# Patient Record
Sex: MALE | Race: WHITE | NOT HISPANIC OR LATINO | Employment: OTHER | URBAN - METROPOLITAN AREA
[De-identification: names, ages, dates, MRNs, and addresses within clinical notes are randomized per-mention and may not be internally consistent; named-entity substitution may affect disease eponyms.]

---

## 2023-02-02 PROBLEM — J18.9 PNEUMONIA OF BOTH LUNGS: Status: ACTIVE | Noted: 2023-02-02

## 2023-02-02 PROBLEM — R05.3 COUGH, PERSISTENT: Status: ACTIVE | Noted: 2023-02-02

## 2023-02-02 PROBLEM — R53.83 FATIGUE: Status: ACTIVE | Noted: 2023-02-02

## 2023-02-02 PROBLEM — H52.00 HYPEROPIA: Status: ACTIVE | Noted: 2023-02-02

## 2023-02-02 PROBLEM — H25.813 COMBINED FORM OF AGE-RELATED CATARACT, BOTH EYES: Status: ACTIVE | Noted: 2023-02-02

## 2023-02-02 PROBLEM — H53.8 VISION BLURRING: Status: ACTIVE | Noted: 2023-02-02

## 2023-02-02 PROBLEM — R07.9 CHEST PAIN: Status: ACTIVE | Noted: 2023-02-02

## 2023-02-02 PROBLEM — M19.031 OSTEOARTHRITIS OF BOTH WRISTS: Status: ACTIVE | Noted: 2023-02-02

## 2023-02-02 PROBLEM — R60.9 EDEMA: Status: ACTIVE | Noted: 2023-02-02

## 2023-02-02 PROBLEM — M77.8 WRIST CAPSULITIS: Status: ACTIVE | Noted: 2023-02-02

## 2023-02-02 PROBLEM — I48.19 PERSISTENT ATRIAL FIBRILLATION (MULTI): Status: ACTIVE | Noted: 2023-02-02

## 2023-02-02 PROBLEM — N52.9 ED (ERECTILE DYSFUNCTION): Status: ACTIVE | Noted: 2023-02-02

## 2023-02-02 PROBLEM — R09.89 CHEST CONGESTION: Status: ACTIVE | Noted: 2023-02-02

## 2023-02-02 PROBLEM — I10 HYPERTENSION: Status: ACTIVE | Noted: 2023-02-02

## 2023-02-02 PROBLEM — G47.33 OSA ON CPAP: Status: ACTIVE | Noted: 2023-02-02

## 2023-02-02 PROBLEM — K21.9 ACID REFLUX: Status: ACTIVE | Noted: 2023-02-02

## 2023-02-02 PROBLEM — H52.4 MYOPIA WITH PRESBYOPIA: Status: ACTIVE | Noted: 2023-02-02

## 2023-02-02 PROBLEM — N40.0 BPH (BENIGN PROSTATIC HYPERPLASIA): Status: ACTIVE | Noted: 2023-02-02

## 2023-02-02 PROBLEM — H61.23 IMPACTED CERUMEN OF BOTH EARS: Status: ACTIVE | Noted: 2023-02-02

## 2023-02-02 PROBLEM — R06.09 DYSPNEA ON EXERTION: Status: ACTIVE | Noted: 2023-02-02

## 2023-02-02 PROBLEM — H04.123 DRY EYES, BILATERAL: Status: ACTIVE | Noted: 2023-02-02

## 2023-02-02 PROBLEM — S69.90XA WRIST INJURIES: Status: ACTIVE | Noted: 2023-02-02

## 2023-02-02 PROBLEM — H60.8X3 CHRONIC ECZEMATOUS OTITIS EXTERNA OF BOTH EARS: Status: ACTIVE | Noted: 2023-02-02

## 2023-02-02 PROBLEM — H92.10 EAR DRAINAGE: Status: ACTIVE | Noted: 2023-02-02

## 2023-02-02 PROBLEM — U07.1 COVID-19 VIRUS INFECTION: Status: ACTIVE | Noted: 2023-02-02

## 2023-02-02 PROBLEM — F41.8 DEPRESSION WITH ANXIETY: Status: ACTIVE | Noted: 2023-02-02

## 2023-02-02 PROBLEM — M19.032 OSTEOARTHRITIS OF BOTH WRISTS: Status: ACTIVE | Noted: 2023-02-02

## 2023-02-02 PROBLEM — H53.9 VISION ABNORMALITIES: Status: ACTIVE | Noted: 2023-02-02

## 2023-02-02 PROBLEM — R93.89 ABNORMAL CHEST CT: Status: ACTIVE | Noted: 2023-02-02

## 2023-02-02 PROBLEM — M25.531 RIGHT WRIST PAIN: Status: ACTIVE | Noted: 2023-02-02

## 2023-02-02 PROBLEM — R93.1 ELEVATED CORONARY ARTERY CALCIUM SCORE: Status: ACTIVE | Noted: 2023-02-02

## 2023-02-02 PROBLEM — S22.20XA STERNUM FX: Status: ACTIVE | Noted: 2023-02-02
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
## 2023-02-02 PROBLEM — R63.5 WEIGHT GAIN: Status: ACTIVE | Noted: 2023-02-02

## 2023-02-02 PROBLEM — E78.5 HYPERLIPIDEMIA: Status: ACTIVE | Noted: 2023-02-02

## 2023-02-02 PROBLEM — H52.10 MYOPIA WITH PRESBYOPIA: Status: ACTIVE | Noted: 2023-02-02

## 2023-02-02 PROBLEM — D31.32 CHOROIDAL NEVUS OF LEFT EYE: Status: ACTIVE | Noted: 2023-02-02

## 2023-02-02 PROBLEM — E03.9 HYPOTHYROIDISM: Status: ACTIVE | Noted: 2023-02-02

## 2023-02-02 PROBLEM — N52.9 ORGANIC IMPOTENCE: Status: ACTIVE | Noted: 2023-02-02

## 2023-02-02 RX ORDER — TADALAFIL 20 MG/1
20 TABLET ORAL DAILY PRN
COMMUNITY
Start: 2019-09-13

## 2023-02-02 RX ORDER — METOPROLOL SUCCINATE 25 MG/1
1 TABLET, EXTENDED RELEASE ORAL DAILY
COMMUNITY
Start: 2022-05-25 | End: 2023-03-16 | Stop reason: SDUPTHER

## 2023-02-02 RX ORDER — AMLODIPINE BESYLATE 5 MG/1
5 TABLET ORAL DAILY
COMMUNITY
End: 2023-10-23

## 2023-02-02 RX ORDER — BENZONATATE 100 MG/1
100 CAPSULE ORAL 3 TIMES DAILY PRN
COMMUNITY
Start: 2022-09-15

## 2023-02-02 RX ORDER — PRAVASTATIN SODIUM 10 MG/1
1 TABLET ORAL DAILY
COMMUNITY
Start: 2019-05-31 | End: 2023-06-16 | Stop reason: SDUPTHER

## 2023-02-02 RX ORDER — OMEPRAZOLE 20 MG/1
20 CAPSULE, DELAYED RELEASE ORAL
COMMUNITY
Start: 2019-05-31

## 2023-02-02 RX ORDER — ASPIRIN 81 MG/1
1 TABLET ORAL DAILY
COMMUNITY
Start: 2019-05-31 | End: 2024-01-12 | Stop reason: ALTCHOICE

## 2023-02-02 RX ORDER — TRIAMTERENE/HYDROCHLOROTHIAZID 37.5-25 MG
1 TABLET ORAL DAILY
COMMUNITY
Start: 2019-05-07 | End: 2023-06-16 | Stop reason: SDUPTHER

## 2023-02-02 RX ORDER — ESCITALOPRAM OXALATE 20 MG/1
1 TABLET ORAL DAILY
COMMUNITY
Start: 2021-12-21

## 2023-03-16 ENCOUNTER — OFFICE VISIT (OUTPATIENT)
Dept: PRIMARY CARE | Facility: CLINIC | Age: 68
End: 2023-03-16
Payer: COMMERCIAL

## 2023-03-16 VITALS
TEMPERATURE: 98.6 F | SYSTOLIC BLOOD PRESSURE: 126 MMHG | WEIGHT: 200 LBS | BODY MASS INDEX: 27.12 KG/M2 | DIASTOLIC BLOOD PRESSURE: 84 MMHG

## 2023-03-16 DIAGNOSIS — I48.19 PERSISTENT ATRIAL FIBRILLATION (MULTI): ICD-10-CM

## 2023-03-16 DIAGNOSIS — I10 HYPERTENSION, UNSPECIFIED TYPE: Primary | ICD-10-CM

## 2023-03-16 DIAGNOSIS — E78.5 HYPERLIPIDEMIA, UNSPECIFIED HYPERLIPIDEMIA TYPE: ICD-10-CM

## 2023-03-16 DIAGNOSIS — R53.83 FATIGUE, UNSPECIFIED TYPE: ICD-10-CM

## 2023-03-16 DIAGNOSIS — Z12.5 SCREENING PSA (PROSTATE SPECIFIC ANTIGEN): ICD-10-CM

## 2023-03-16 PROCEDURE — 3074F SYST BP LT 130 MM HG: CPT | Performed by: INTERNAL MEDICINE

## 2023-03-16 PROCEDURE — 80061 LIPID PANEL: CPT | Performed by: INTERNAL MEDICINE

## 2023-03-16 PROCEDURE — 99214 OFFICE O/P EST MOD 30 MIN: CPT | Performed by: INTERNAL MEDICINE

## 2023-03-16 PROCEDURE — 1159F MED LIST DOCD IN RCRD: CPT | Performed by: INTERNAL MEDICINE

## 2023-03-16 PROCEDURE — 36415 COLL VENOUS BLD VENIPUNCTURE: CPT | Performed by: INTERNAL MEDICINE

## 2023-03-16 PROCEDURE — 80053 COMPREHEN METABOLIC PANEL: CPT | Performed by: INTERNAL MEDICINE

## 2023-03-16 PROCEDURE — 1036F TOBACCO NON-USER: CPT | Performed by: INTERNAL MEDICINE

## 2023-03-16 PROCEDURE — 3079F DIAST BP 80-89 MM HG: CPT | Performed by: INTERNAL MEDICINE

## 2023-03-16 PROCEDURE — 84153 ASSAY OF PSA TOTAL: CPT | Performed by: INTERNAL MEDICINE

## 2023-03-16 PROCEDURE — 84443 ASSAY THYROID STIM HORMONE: CPT | Performed by: INTERNAL MEDICINE

## 2023-03-16 PROCEDURE — 85025 COMPLETE CBC W/AUTO DIFF WBC: CPT | Performed by: INTERNAL MEDICINE

## 2023-03-16 PROCEDURE — 1160F RVW MEDS BY RX/DR IN RCRD: CPT | Performed by: INTERNAL MEDICINE

## 2023-03-16 RX ORDER — METOPROLOL SUCCINATE 25 MG/1
25 TABLET, EXTENDED RELEASE ORAL DAILY
Qty: 90 TABLET | Refills: 3 | Status: SHIPPED | OUTPATIENT
Start: 2023-03-16 | End: 2023-12-07 | Stop reason: SDUPTHER

## 2023-03-16 ASSESSMENT — ENCOUNTER SYMPTOMS
OCCASIONAL FEELINGS OF UNSTEADINESS: 0
LOSS OF SENSATION IN FEET: 0
DEPRESSION: 0

## 2023-03-16 NOTE — PROGRESS NOTES
Subjective   Patient ID: Andrew Acharya is a 67 y.o. male who presents for Follow-up.    HPI   67 years old comes to see me to check on his condition and on his blood pressure.  He is being treated for hypertension and atrial fibrillation.  Hyperlipidemia and ADD.  His medication reviewed and reconciled.  Review of Systems  Complaining of bilateral knee stiffness and feeling difficulty to stand up from his sitting position.  Objective   /84 (BP Location: Left arm, Patient Position: Sitting)   Temp 37 °C (98.6 °F)   Wt 90.7 kg (200 lb)   BMI 27.12 kg/m²     Physical Exam  Alert oriented in no distress pleasant cooperative.  Face symmetrical cranial nerves intact.  Nonicteric sclera or jaundice.  Lungs clear no rales wheezing or crackles.  Heart normal S1 and S2 regular rhythm.  Abdomen benign nontender no masses no organomegaly no pain on palpations.  Neurological exam intact.  Examination of both knees reveals stiffness but able to move both knees equally.  Assessment/Plan   Diagnoses and all orders for this visit:  Hypertension, unspecified type  -     Comprehensive Metabolic Panel  -     metoprolol succinate XL (Toprol-XL) 25 mg 24 hr tablet; Take 1 tablet (25 mg) by mouth once daily.  Hyperlipidemia, unspecified hyperlipidemia type  -     Lipid Panel  Fatigue, unspecified type  -     CBC  -     Thyroid Stimulating Hormone  Screening PSA (prostate specific antigen)  -     Prostate Specific Antigen, Screen  Persistent atrial fibrillation (CMS/HCC)  -     metoprolol succinate XL (Toprol-XL) 25 mg 24 hr tablet; Take 1 tablet (25 mg) by mouth once daily.

## 2023-03-29 ENCOUNTER — TELEPHONE (OUTPATIENT)
Dept: PRIMARY CARE | Facility: CLINIC | Age: 68
End: 2023-03-29
Payer: COMMERCIAL

## 2023-03-29 NOTE — TELEPHONE ENCOUNTER
I called the patient and discussed lab results with him.  All within normal limit watch your diet stay active and lose weight.

## 2023-04-24 ENCOUNTER — TELEPHONE (OUTPATIENT)
Dept: PRIMARY CARE | Facility: CLINIC | Age: 68
End: 2023-04-24
Payer: COMMERCIAL

## 2023-04-24 DIAGNOSIS — I10 HYPERTENSION, UNSPECIFIED TYPE: ICD-10-CM

## 2023-04-24 RX ORDER — METOPROLOL SUCCINATE 25 MG/1
25 TABLET, EXTENDED RELEASE ORAL DAILY
Qty: 90 TABLET | Refills: 0 | Status: SHIPPED | OUTPATIENT
Start: 2023-04-24 | End: 2023-07-23

## 2023-06-15 ENCOUNTER — OFFICE VISIT (OUTPATIENT)
Dept: PRIMARY CARE | Facility: CLINIC | Age: 68
End: 2023-06-15
Payer: COMMERCIAL

## 2023-06-15 VITALS — DIASTOLIC BLOOD PRESSURE: 86 MMHG | WEIGHT: 196 LBS | BODY MASS INDEX: 26.58 KG/M2 | SYSTOLIC BLOOD PRESSURE: 136 MMHG

## 2023-06-15 DIAGNOSIS — R53.83 FATIGUE, UNSPECIFIED TYPE: ICD-10-CM

## 2023-06-15 DIAGNOSIS — E78.5 HYPERLIPIDEMIA, UNSPECIFIED HYPERLIPIDEMIA TYPE: ICD-10-CM

## 2023-06-15 DIAGNOSIS — I10 HYPERTENSION, UNSPECIFIED TYPE: Primary | ICD-10-CM

## 2023-06-15 DIAGNOSIS — I48.19 PERSISTENT ATRIAL FIBRILLATION (MULTI): ICD-10-CM

## 2023-06-15 PROCEDURE — 3075F SYST BP GE 130 - 139MM HG: CPT | Performed by: INTERNAL MEDICINE

## 2023-06-15 PROCEDURE — 1160F RVW MEDS BY RX/DR IN RCRD: CPT | Performed by: INTERNAL MEDICINE

## 2023-06-15 PROCEDURE — 1159F MED LIST DOCD IN RCRD: CPT | Performed by: INTERNAL MEDICINE

## 2023-06-15 PROCEDURE — 3079F DIAST BP 80-89 MM HG: CPT | Performed by: INTERNAL MEDICINE

## 2023-06-15 PROCEDURE — 1036F TOBACCO NON-USER: CPT | Performed by: INTERNAL MEDICINE

## 2023-06-15 PROCEDURE — 99213 OFFICE O/P EST LOW 20 MIN: CPT | Performed by: INTERNAL MEDICINE

## 2023-06-15 NOTE — PROGRESS NOTES
Subjective   Patient ID: Andrew Acharya is a 68 y.o. male who presents for No chief complaint on file..    HPI   68 years old male comes in to see me for a follow-up visit.  He had atrial fibrillation recurrent and lasted for almost a month.  Now it is gone and his sinus rhythm is back to normal again.  He almost went to see the cardiologist but there was no need to do anything about it according to his cardiologist.  No symptoms to speak of today.  Denies any chest pain or shortness of breath GI  or neurological symptoms.  Feels tired.  Medication reviewed and reconciled.  Review of Systems  12 system reviewed and all negative.  Objective   /86   Wt 88.9 kg (196 lb)   BMI 26.58 kg/m²     Physical Exam  Alert oriented in no distress.  Nonicteric sclera or jaundice.  Face symmetrical cranial nerves intact.  Neck supple no masses no thyromegaly or masses palpable.  No jugular venous distention.  Lungs clear no rales no wheezing or crackles.  Heart normal sinus rhythm no signs of atrial fib, normal S1 and S2.  Abdomen benign neurologically intact.  Assessment/Plan   Diagnoses and all orders for this visit:  Hypertension, unspecified type  Hyperlipidemia, unspecified hyperlipidemia type  Fatigue, unspecified type  Persistent atrial fibrillation (CMS/HCC)

## 2023-06-16 RX ORDER — TRIAMTERENE/HYDROCHLOROTHIAZID 37.5-25 MG
1 TABLET ORAL DAILY
Qty: 90 TABLET | Refills: 3 | Status: SHIPPED | OUTPATIENT
Start: 2023-06-16 | End: 2023-12-07 | Stop reason: SDUPTHER

## 2023-06-16 RX ORDER — TRIAMTERENE/HYDROCHLOROTHIAZID 37.5-25 MG
1 TABLET ORAL DAILY
COMMUNITY
Start: 2019-05-07

## 2023-06-16 RX ORDER — PRAVASTATIN SODIUM 10 MG/1
1 TABLET ORAL DAILY
COMMUNITY
Start: 2019-05-31

## 2023-06-16 RX ORDER — PRAVASTATIN SODIUM 10 MG/1
10 TABLET ORAL NIGHTLY
Qty: 90 TABLET | Refills: 3 | Status: SHIPPED | OUTPATIENT
Start: 2023-06-16 | End: 2023-12-07 | Stop reason: SDUPTHER

## 2023-09-08 ENCOUNTER — OFFICE VISIT (OUTPATIENT)
Dept: PRIMARY CARE | Facility: CLINIC | Age: 68
End: 2023-09-08
Payer: MEDICARE

## 2023-09-08 VITALS
DIASTOLIC BLOOD PRESSURE: 82 MMHG | BODY MASS INDEX: 26.04 KG/M2 | WEIGHT: 192 LBS | TEMPERATURE: 96.9 F | SYSTOLIC BLOOD PRESSURE: 128 MMHG

## 2023-09-08 DIAGNOSIS — E78.5 HYPERLIPIDEMIA, UNSPECIFIED HYPERLIPIDEMIA TYPE: ICD-10-CM

## 2023-09-08 DIAGNOSIS — R63.4 WEIGHT LOSS: ICD-10-CM

## 2023-09-08 DIAGNOSIS — I48.19 PERSISTENT ATRIAL FIBRILLATION (MULTI): ICD-10-CM

## 2023-09-08 DIAGNOSIS — R63.0 APPETITE LOSS: ICD-10-CM

## 2023-09-08 DIAGNOSIS — I10 HYPERTENSION, UNSPECIFIED TYPE: Primary | ICD-10-CM

## 2023-09-08 PROCEDURE — 85025 COMPLETE CBC W/AUTO DIFF WBC: CPT | Performed by: INTERNAL MEDICINE

## 2023-09-08 PROCEDURE — 1126F AMNT PAIN NOTED NONE PRSNT: CPT | Performed by: INTERNAL MEDICINE

## 2023-09-08 PROCEDURE — 1159F MED LIST DOCD IN RCRD: CPT | Performed by: INTERNAL MEDICINE

## 2023-09-08 PROCEDURE — 3074F SYST BP LT 130 MM HG: CPT | Performed by: INTERNAL MEDICINE

## 2023-09-08 PROCEDURE — 80061 LIPID PANEL: CPT | Performed by: INTERNAL MEDICINE

## 2023-09-08 PROCEDURE — 80053 COMPREHEN METABOLIC PANEL: CPT | Performed by: INTERNAL MEDICINE

## 2023-09-08 PROCEDURE — 1036F TOBACCO NON-USER: CPT | Performed by: INTERNAL MEDICINE

## 2023-09-08 PROCEDURE — 1160F RVW MEDS BY RX/DR IN RCRD: CPT | Performed by: INTERNAL MEDICINE

## 2023-09-08 PROCEDURE — 99214 OFFICE O/P EST MOD 30 MIN: CPT | Performed by: INTERNAL MEDICINE

## 2023-09-08 PROCEDURE — 3079F DIAST BP 80-89 MM HG: CPT | Performed by: INTERNAL MEDICINE

## 2023-09-08 ASSESSMENT — PAIN SCALES - GENERAL: PAINLEVEL: 0-NO PAIN

## 2023-09-08 NOTE — PROGRESS NOTES
Subjective   Patient ID: Andrew Acharya is a 68 y.o. male who presents for Follow-up (Follow up).    HPI   68 years old male comes in to see me to check on his atrial fibrillation, hypertension and hyperlipidemia.  He has no appetite recently and he is eating less.  He lost 4 pounds.  No other GI symptoms's interviewed, no abdominal pain nausea vomiting blood in the stools constipation or diarrhea.  Just had no appetite.  At night during his sleep he feels spasm on his left thigh.  Medication reviewed and reconciled.  He is taking most of his medication at night to avoid side effects.  We added turmeric to Maxide 25 mg.  Taking Eliquis 5 mg twice a day, amlodipine 5 mg a day at night, escitalopram or Lexapro 20 mg at night metoprolol succinate or Toprol XL 25 mg at night, pravastatin 10 mg at dinner.      Review of Systems  12 system reviewed and 12 system negative.  Objective   /82 (BP Location: Left arm, Patient Position: Sitting, BP Cuff Size: Adult)   Temp 36.1 °C (96.9 °F) (Temporal)   Wt 87.1 kg (192 lb)   BMI 26.04 kg/m²     Physical Exam  Alert oriented in no acute distress.  Nonicteric sclera no jaundice.  Face symmetrical cranial nerves intact.  Neck supple no masses no lymph node thyromegaly or jugular venous distention.  Lungs diminished but clear no rales no wheezing.  Heart normal S1 and S2 regular rhythm.  Abdomen benign nontender no masses or organomegaly.  Neurological exam intact.  We agreed that if his appetite is always low and he is losing weight we can try to start Remeron at bedtime as a appetite stimulant.  We will watch and we will see.  Sample of Eliquis provided.  Assessment/Plan   Diagnoses and all orders for this visit:  Hypertension, unspecified type  -     CBC  -     Comprehensive Metabolic Panel  Hyperlipidemia, unspecified hyperlipidemia type  -     Lipid Panel  Persistent atrial fibrillation (CMS/HCC)  Appetite loss  Weight loss

## 2023-09-09 ENCOUNTER — TELEPHONE (OUTPATIENT)
Dept: PRIMARY CARE | Facility: CLINIC | Age: 68
End: 2023-09-09
Payer: COMMERCIAL

## 2023-09-09 NOTE — TELEPHONE ENCOUNTER
I called the patient and I discussed lab results with him.  Within normal limit most of the day.  Encouraged him to eat well and to gain weight.  He likes candy and other things but not with food.  He ate 1 meal a day.

## 2023-11-09 ENCOUNTER — OFFICE VISIT (OUTPATIENT)
Dept: PRIMARY CARE | Facility: CLINIC | Age: 68
End: 2023-11-09
Payer: COMMERCIAL

## 2023-11-09 VITALS
HEIGHT: 72 IN | TEMPERATURE: 97.2 F | BODY MASS INDEX: 26.41 KG/M2 | HEART RATE: 75 BPM | DIASTOLIC BLOOD PRESSURE: 109 MMHG | WEIGHT: 195 LBS | SYSTOLIC BLOOD PRESSURE: 154 MMHG

## 2023-11-09 DIAGNOSIS — I10 HYPERTENSION, UNSPECIFIED TYPE: ICD-10-CM

## 2023-11-09 DIAGNOSIS — R94.5 ABNORMAL LIVER FUNCTION: ICD-10-CM

## 2023-11-09 DIAGNOSIS — E78.5 HYPERLIPIDEMIA, UNSPECIFIED HYPERLIPIDEMIA TYPE: ICD-10-CM

## 2023-11-09 DIAGNOSIS — I48.19 PERSISTENT ATRIAL FIBRILLATION (MULTI): ICD-10-CM

## 2023-11-09 DIAGNOSIS — Z87.19 H/O: GI BLEED: Primary | ICD-10-CM

## 2023-11-09 DIAGNOSIS — Z12.11 SCREENING FOR MALIGNANT NEOPLASM OF COLON: ICD-10-CM

## 2023-11-09 PROCEDURE — 85025 COMPLETE CBC W/AUTO DIFF WBC: CPT | Performed by: INTERNAL MEDICINE

## 2023-11-09 PROCEDURE — 80076 HEPATIC FUNCTION PANEL: CPT | Performed by: INTERNAL MEDICINE

## 2023-11-09 PROCEDURE — 99214 OFFICE O/P EST MOD 30 MIN: CPT | Performed by: INTERNAL MEDICINE

## 2023-11-09 PROCEDURE — 1159F MED LIST DOCD IN RCRD: CPT | Performed by: INTERNAL MEDICINE

## 2023-11-09 PROCEDURE — 1036F TOBACCO NON-USER: CPT | Performed by: INTERNAL MEDICINE

## 2023-11-09 PROCEDURE — 1160F RVW MEDS BY RX/DR IN RCRD: CPT | Performed by: INTERNAL MEDICINE

## 2023-11-09 PROCEDURE — 3080F DIAST BP >= 90 MM HG: CPT | Performed by: INTERNAL MEDICINE

## 2023-11-09 PROCEDURE — 3077F SYST BP >= 140 MM HG: CPT | Performed by: INTERNAL MEDICINE

## 2023-11-09 PROCEDURE — 1126F AMNT PAIN NOTED NONE PRSNT: CPT | Performed by: INTERNAL MEDICINE

## 2023-11-09 ASSESSMENT — PAIN SCALES - GENERAL: PAINLEVEL: 0-NO PAIN

## 2023-11-09 NOTE — PROGRESS NOTES
Subjective   Patient ID: Andrew Acharya is a 68 y.o. male who presents for regular follow-up visit.    HPI   68 years old male comes in to see me after he had few episodes in the last month one of them he was admitted to [facility] for Cholecystitis cholelithiasis , underwent cholecystectomy and spent 7 days in the hospital, 3 weeks later he went to a  in [place] and overdid it with alcohol, he ended up with nausea and vomiting blood, not to forget he is taking Eliquis also.  Aspirin was stopped.  He is today with no specific complaint, no nausea vomiting or hemoptysis.  No bleeding from the rectum.  We discussed drinking again.  It seems to me that he is not learning from past experiences.  In any event we discussed about alcohol and GI bleed which can cause gastritis especially on Eliquis definitely it can happen.  Promised not to drink anymore.  We agreed to consult GI for EGD and colonoscopy since she did not send the Cologuard.    ROS  12 system reviewed and 12 systems are negative right now to include no nausea no vomiting no GI bleed.  Objective   BP (!) 154/109 (BP Location: Left arm, Patient Position: Sitting, BP Cuff Size: Adult)   Pulse 75   Temp 36.2 °C (97.2 °F) (Skin)   Ht 1.829 m (6')   Wt 88.5 kg (195 lb)   BMI 26.45 kg/m²     Physical Exam  Alert oriented in no acute distress.  Good color.  Nonicteric sclera no jaundice.  Face symmetrical cranial nerves intact.  Neck supple no masses lymph no thyromegaly or jugular venous distention.  Lungs clear no rales wheezing or crackles.  Heart normal S1 and S2 regular rhythm.  Abdomen benign nontender no masses no organomegaly.  Neurological exam intact.  Blood pressure was controlled and we repeated blood pressure and was 150/100 he stopped taking his blood pressure medication for a week and started again last night.  We urged him to continue taking his blood pressure pills as usual and to call me if his blood pressure remains elevated.  He  takes amlodipine 5 mg a day metoprolol succinate 25 mg a day triamterene HCTZ 37.5/25 mg a day  Agreed to consult GI for colonoscopy and EGD  Assessment/Plan   Diagnoses and all orders for this visit:  H/O: GI bleed  -     CBC  -     EGD; Future  Abnormal liver function  -     Hepatic Function Panel  Screening for malignant neoplasm of colon  -     Colonoscopy Screening; Average Risk Patient; Future  Persistent atrial fibrillation (CMS/HCC)  Hypertension, unspecified type  Hyperlipidemia, unspecified hyperlipidemia type

## 2023-11-13 ENCOUNTER — TELEPHONE (OUTPATIENT)
Dept: PRIMARY CARE | Facility: CLINIC | Age: 68
End: 2023-11-13
Payer: COMMERCIAL

## 2023-11-13 NOTE — TELEPHONE ENCOUNTER
PC:LM-Results are good, VERY MILD ANEMIA, recovering, AND normal liver function,   Please do not use alcohol while you are on ELIQUIS , IT IS A RECIPE TO BLEED.

## 2023-12-07 RX ORDER — PANTOPRAZOLE SODIUM 40 MG/1
TABLET, DELAYED RELEASE ORAL
COMMUNITY
Start: 2023-11-05

## 2023-12-08 ENCOUNTER — APPOINTMENT (OUTPATIENT)
Dept: PRIMARY CARE | Facility: CLINIC | Age: 68
End: 2023-12-08
Payer: COMMERCIAL

## 2023-12-12 ENCOUNTER — OFFICE VISIT (OUTPATIENT)
Dept: GASTROENTEROLOGY | Facility: CLINIC | Age: 68
End: 2023-12-12
Payer: COMMERCIAL

## 2023-12-12 VITALS — OXYGEN SATURATION: 93 % | WEIGHT: 196 LBS | BODY MASS INDEX: 26.55 KG/M2 | HEART RATE: 95 BPM | HEIGHT: 72 IN

## 2023-12-12 DIAGNOSIS — Z12.11 SCREENING FOR MALIGNANT NEOPLASM OF COLON: ICD-10-CM

## 2023-12-12 DIAGNOSIS — Z87.19 H/O: GI BLEED: ICD-10-CM

## 2023-12-12 DIAGNOSIS — K92.0 HEMATEMESIS WITH NAUSEA: Primary | ICD-10-CM

## 2023-12-12 PROCEDURE — 1126F AMNT PAIN NOTED NONE PRSNT: CPT

## 2023-12-12 PROCEDURE — 1036F TOBACCO NON-USER: CPT

## 2023-12-12 PROCEDURE — 1160F RVW MEDS BY RX/DR IN RCRD: CPT

## 2023-12-12 PROCEDURE — 1159F MED LIST DOCD IN RCRD: CPT

## 2023-12-12 PROCEDURE — 99204 OFFICE O/P NEW MOD 45 MIN: CPT

## 2023-12-12 RX ORDER — POLYETHYLENE GLYCOL 3350, SODIUM SULFATE ANHYDROUS, SODIUM BICARBONATE, SODIUM CHLORIDE, POTASSIUM CHLORIDE 236; 22.74; 6.74; 5.86; 2.97 G/4L; G/4L; G/4L; G/4L; G/4L
4 POWDER, FOR SOLUTION ORAL ONCE
Qty: 4000 ML | Refills: 0 | Status: SHIPPED | OUTPATIENT
Start: 2023-12-12 | End: 2023-12-12

## 2023-12-12 ASSESSMENT — ENCOUNTER SYMPTOMS
ABDOMINAL DISTENTION: 0
CONSTIPATION: 0
NAUSEA: 0
ABDOMINAL PAIN: 0
TROUBLE SWALLOWING: 0
FEVER: 0
RECTAL PAIN: 0
DIARRHEA: 0
CHILLS: 0
COUGH: 0
APPETITE CHANGE: 0
SHORTNESS OF BREATH: 0
ANAL BLEEDING: 0
BLOOD IN STOOL: 0
FATIGUE: 0
VOMITING: 0

## 2023-12-12 NOTE — PATIENT INSTRUCTIONS
I recommend alcohol cessation  Please schedule your upper endoscopy and colonoscopy. You will need a ride since this involves sedation.  I will send a bowel prep to your pharmacy.  Clear liquid diet the day before the procedure. Start the 1st part of the prep at 6 pm the night prior and the other half 5 hrs before the procedure.  Please hold your Eliquis 2 days prior to your procedure.  Confirm with your prescribing physician.  Follow up as needed

## 2023-12-12 NOTE — PROGRESS NOTES
Subjective     History of Present Illness:   Andrew Acharya is a 68 y.o. male with PMHx of acid reflux, HLD, HTN, hypothyroidism, MARVA, A-fib on Eliquis who presents to GI clinic for further evaluation colonoscopy    Today, intermittently gets heartburn every other week after eating spicy food.  Takes Protonix to control.  After drinking a lot of alcohol, was vomiting blood a few months ago multiple times over a 24 hr period in [place].  Went to hospital there and was given nausea medicine, but had no procedure performed to further evaluate, was discharged home.  States he needs a colonoscopy.  History of elevated LFT, RUQ ultrasound 10/2023 showed no abnormalities within the liver.  States it was attributed to drinking alcohol.  Last CBC and CMP 10/2023 reflects hgb 12.1 w/ mild anemia.  PCP ordered repeat and has not been collected.   and   Denies constipation, diarrhea,  melena, hematochezia, dysphagia, unintentional weight loss    Social ETOH-2-3 drinks on the weekends, denies smoking or marijuana  Denies fxh GI cancer or IBD  Abdominal Surgeries: Cholecystectomy    Last colonoscopy can't remember  Denies history of EGD   7/2023 echocardiogram: EF 50 to 55%, mild AV regurgitation    Past Medical History  As per HPI.     Social History  he  reports that he has never smoked. He has never been exposed to tobacco smoke. He has never used smokeless tobacco. He reports current alcohol use of about 2.0 standard drinks of alcohol per week. He reports that he does not use drugs.     Family History  his family history includes Coronary artery disease in his mother and sister; Diabetes in his father; Heart attack in his sister; Mental retardation in his sister.     Review of Systems  Review of Systems   Constitutional:  Negative for appetite change, chills, fatigue and fever.   HENT:  Negative for trouble swallowing.    Respiratory:  Negative for cough and shortness of breath.    Gastrointestinal:  Negative for  abdominal distention, abdominal pain, anal bleeding, blood in stool, constipation, diarrhea, nausea, rectal pain and vomiting.       Allergies  No Known Allergies    Medications  Current Outpatient Medications   Medication Instructions    amLODIPine (NORVASC) 5 mg, oral, Daily    apixaban (Eliquis) 5 mg tablet 1 tablet, oral, 2 times daily    aspirin 81 mg EC tablet 1 tablet, oral, Daily    benzonatate (TESSALON) 100 mg, oral, 3 times daily PRN    escitalopram (Lexapro) 20 mg tablet 1 tablet, oral, Daily    metoprolol succinate XL (TOPROL-XL) 25 mg, oral, Daily, Do not crush or chew.    omeprazole (PRILOSEC) 20 mg, oral, Daily before breakfast    pantoprazole (ProtoNix) 40 mg EC tablet     polyethylene glycol-electrolytes (polyethylene glycol) 420 gram solution 4,000 mL, oral, Once, Take as directed.    pravastatin (Pravachol) 10 mg tablet 1 tablet, oral, Daily    tadalafil (CIALIS) 20 mg, oral, Daily PRN    triamterene-hydrochlorothiazid (Maxzide-25) 37.5-25 mg tablet 1 tablet, oral, Daily        Objective   Visit Vitals  Pulse 95      Physical Exam  Constitutional:       Appearance: Normal appearance. He is normal weight.   HENT:      Mouth/Throat:      Mouth: Mucous membranes are dry.      Pharynx: Oropharynx is clear.   Cardiovascular:      Rate and Rhythm: Normal rate and regular rhythm.   Pulmonary:      Effort: Pulmonary effort is normal.      Breath sounds: Normal breath sounds. No wheezing or rhonchi.   Abdominal:      General: Abdomen is flat. Bowel sounds are normal. There is no distension.      Palpations: Abdomen is soft. There is no hepatomegaly.      Tenderness: There is no abdominal tenderness. There is no guarding or rebound. Negative signs include Luis's sign.      Hernia: No hernia is present.   Musculoskeletal:         General: Normal range of motion.   Skin:     General: Skin is warm and dry.   Neurological:      General: No focal deficit present.      Mental Status: He is alert and oriented  to person, place, and time.   Psychiatric:         Mood and Affect: Mood normal.         Behavior: Behavior normal.           Lab Results   Component Value Date    WBC 6.4 12/15/2022    HGB 13.9 12/15/2022    HCT 44.3 12/15/2022     12/15/2022     Lab Results   Component Value Date     12/15/2022    K 4.6 12/15/2022     12/15/2022    CO2 25 12/15/2022    BUN 22 12/15/2022    CREATININE 1.26 12/15/2022    CALCIUM 9.5 12/15/2022    PROT 7.0 12/15/2022    BILITOT 1.1 12/15/2022    ALKPHOS 75 12/15/2022    ALT 12 12/15/2022    AST 26 12/15/2022    GLUCOSE 77 12/15/2022           Andrew Acharya is a 68 y.o. male who presents to GI clinic for screening colonoscopy and hematemesis.    Hematemesis with nausea  Consider Rosalina-Ware tear, esophagitis, duodenitis, gastritis, PUD  -Schedule EGD  -May continue 40 mg Protonix daily  Follow-up as needed    Screening for malignant neoplasm of colon  Schedule colonoscopy.  Recommendation to hold Eliquis 2 days prior to procedure         EVANGELINA Ramirez-CNP

## 2023-12-12 NOTE — ASSESSMENT & PLAN NOTE
Consider Rosalina-Ware tear, esophagitis, duodenitis, gastritis, PUD  -Schedule EGD  -May continue 40 mg Protonix daily  -Alcohol cessation recommended  Follow-up as needed   Olopatadine 0.1% solution not covered by pt's insurance.  They will cover Lastacaftsol and Ketotifen Opth solution.  Please call to advise.

## 2023-12-13 DIAGNOSIS — I48.21 PERMANENT ATRIAL FIBRILLATION (MULTI): Primary | ICD-10-CM

## 2024-01-12 ENCOUNTER — OFFICE VISIT (OUTPATIENT)
Dept: PRIMARY CARE | Facility: CLINIC | Age: 69
End: 2024-01-12
Payer: COMMERCIAL

## 2024-01-12 VITALS
OXYGEN SATURATION: 96 % | TEMPERATURE: 97.2 F | BODY MASS INDEX: 26.45 KG/M2 | SYSTOLIC BLOOD PRESSURE: 111 MMHG | WEIGHT: 195 LBS | DIASTOLIC BLOOD PRESSURE: 79 MMHG | HEART RATE: 74 BPM

## 2024-01-12 DIAGNOSIS — F32.1 CURRENT MODERATE EPISODE OF MAJOR DEPRESSIVE DISORDER, UNSPECIFIED WHETHER RECURRENT (MULTI): Primary | ICD-10-CM

## 2024-01-12 DIAGNOSIS — E78.5 HYPERLIPIDEMIA, UNSPECIFIED HYPERLIPIDEMIA TYPE: ICD-10-CM

## 2024-01-12 DIAGNOSIS — I10 HYPERTENSION, UNSPECIFIED TYPE: ICD-10-CM

## 2024-01-12 DIAGNOSIS — I48.19 PERSISTENT ATRIAL FIBRILLATION (MULTI): ICD-10-CM

## 2024-01-12 PROCEDURE — 3078F DIAST BP <80 MM HG: CPT | Performed by: INTERNAL MEDICINE

## 2024-01-12 PROCEDURE — 3074F SYST BP LT 130 MM HG: CPT | Performed by: INTERNAL MEDICINE

## 2024-01-12 PROCEDURE — 1126F AMNT PAIN NOTED NONE PRSNT: CPT | Performed by: INTERNAL MEDICINE

## 2024-01-12 PROCEDURE — 1036F TOBACCO NON-USER: CPT | Performed by: INTERNAL MEDICINE

## 2024-01-12 PROCEDURE — 1159F MED LIST DOCD IN RCRD: CPT | Performed by: INTERNAL MEDICINE

## 2024-01-12 PROCEDURE — 99214 OFFICE O/P EST MOD 30 MIN: CPT | Performed by: INTERNAL MEDICINE

## 2024-01-12 RX ORDER — VENLAFAXINE HYDROCHLORIDE 37.5 MG/1
37.5 CAPSULE, EXTENDED RELEASE ORAL DAILY
Qty: 30 CAPSULE | Refills: 2 | Status: SHIPPED | OUTPATIENT
Start: 2024-01-12 | End: 2024-03-12

## 2024-01-12 ASSESSMENT — PATIENT HEALTH QUESTIONNAIRE - PHQ9
10. IF YOU CHECKED OFF ANY PROBLEMS, HOW DIFFICULT HAVE THESE PROBLEMS MADE IT FOR YOU TO DO YOUR WORK, TAKE CARE OF THINGS AT HOME, OR GET ALONG WITH OTHER PEOPLE: SOMEWHAT DIFFICULT
2. FEELING DOWN, DEPRESSED OR HOPELESS: NOT AT ALL
SUM OF ALL RESPONSES TO PHQ9 QUESTIONS 1 AND 2: 1
1. LITTLE INTEREST OR PLEASURE IN DOING THINGS: SEVERAL DAYS

## 2024-01-12 ASSESSMENT — ENCOUNTER SYMPTOMS
LOSS OF SENSATION IN FEET: 0
DEPRESSION: 0
OCCASIONAL FEELINGS OF UNSTEADINESS: 0

## 2024-01-12 NOTE — PROGRESS NOTES
Subjective   Patient ID: Andrew Acharya is a 68 y.o. male who presents for Follow-up.    HPI   68 years old male comes in to see me for follow-up visit.  Not feeling good.  He is okay when he is working otherwise he is bored she will nonproductive and depressed.  He is taking Lexapro 20 mg a day and he feels it is not helping.  The rest of his medications reviewed and reconciled.  He is having EGD and colonoscopy by the end of this month.  His weight is the same and his blood pressure is well-controlled.  He is not drinking as she used to.  Review of Systems  12 system reviewed 12 systems are negative.  Objective   /79 (BP Location: Left arm, Patient Position: Sitting, BP Cuff Size: Adult)   Pulse 74   Temp 36.2 °C (97.2 °F) (Temporal)   Wt 88.5 kg (195 lb)   SpO2 96%   BMI 26.45 kg/m²     Physical Exam  Alert oriented in no acute distress pleasant cooperative but flat affect.  Nonicteric sclera no jaundice.  Face symmetrical cranial nerves intact.  Neck supple no masses no lymph node no thyromegaly or no jugular venous distention.  Lungs clear no rales wheezing or crackles.  Heart normal S1 and S2 regular rhythm even though history of atrial fibrillation but at this time heart is regular rhythm.  Abdomen benign nontender no masses no organomegaly.  Neurologically intact.  We agreed to add Effexor XR 37.5 mg daily and keep him on Lexapro.  Will come back and see him in 1 months  Assessment/Plan   Diagnoses and all orders for this visit:  Current moderate episode of major depressive disorder, unspecified whether recurrent (CMS/HCC)  -     venlafaxine XR (Effexor-XR) 37.5 mg 24 hr capsule; Take 1 capsule (37.5 mg) by mouth once daily. Do not crush or chew.  Hypertension, unspecified type  Hyperlipidemia, unspecified hyperlipidemia type  Persistent atrial fibrillation (CMS/HCC)

## 2024-01-26 DIAGNOSIS — Z12.11 SCREEN FOR COLON CANCER: Primary | ICD-10-CM

## 2024-01-26 RX ORDER — POLYETHYLENE GLYCOL 3350, SODIUM SULFATE ANHYDROUS, SODIUM BICARBONATE, SODIUM CHLORIDE, POTASSIUM CHLORIDE 236; 22.74; 6.74; 5.86; 2.97 G/4L; G/4L; G/4L; G/4L; G/4L
4000 POWDER, FOR SOLUTION ORAL ONCE
Qty: 4000 ML | Refills: 0 | Status: SHIPPED | OUTPATIENT
Start: 2024-01-26 | End: 2024-01-26

## 2024-01-29 ENCOUNTER — APPOINTMENT (OUTPATIENT)
Dept: GASTROENTEROLOGY | Facility: EXTERNAL LOCATION | Age: 69
End: 2024-01-29
Payer: COMMERCIAL

## 2024-01-29 ENCOUNTER — OFFICE VISIT (OUTPATIENT)
Dept: GASTROENTEROLOGY | Facility: EXTERNAL LOCATION | Age: 69
End: 2024-01-29
Payer: COMMERCIAL

## 2024-01-29 DIAGNOSIS — K64.8 INTERNAL HEMORRHOIDS: ICD-10-CM

## 2024-01-29 DIAGNOSIS — K21.00 GASTROESOPHAGEAL REFLUX DISEASE WITH ESOPHAGITIS WITHOUT HEMORRHAGE: Primary | ICD-10-CM

## 2024-01-29 DIAGNOSIS — K31.89 OTHER DISEASES OF STOMACH AND DUODENUM: ICD-10-CM

## 2024-01-29 DIAGNOSIS — K57.30 DIVERTICULOSIS OF LARGE INTESTINE WITHOUT DIVERTICULITIS: ICD-10-CM

## 2024-01-29 DIAGNOSIS — D12.2 BENIGN NEOPLASM OF ASCENDING COLON: ICD-10-CM

## 2024-01-29 DIAGNOSIS — K22.89 OTHER SPECIFIED DISEASE OF ESOPHAGUS: ICD-10-CM

## 2024-01-29 DIAGNOSIS — Z12.11 SCREENING FOR MALIGNANT NEOPLASM OF COLON: ICD-10-CM

## 2024-01-29 DIAGNOSIS — K92.0 HEMATEMESIS WITH NAUSEA: ICD-10-CM

## 2024-01-29 PROCEDURE — 1036F TOBACCO NON-USER: CPT | Performed by: INTERNAL MEDICINE

## 2024-01-29 PROCEDURE — 1159F MED LIST DOCD IN RCRD: CPT | Performed by: INTERNAL MEDICINE

## 2024-01-29 PROCEDURE — 45385 COLONOSCOPY W/LESION REMOVAL: CPT | Performed by: INTERNAL MEDICINE

## 2024-01-29 PROCEDURE — 88305 TISSUE EXAM BY PATHOLOGIST: CPT | Performed by: PATHOLOGY

## 2024-01-29 PROCEDURE — 88305 TISSUE EXAM BY PATHOLOGIST: CPT

## 2024-01-29 PROCEDURE — 1126F AMNT PAIN NOTED NONE PRSNT: CPT | Performed by: INTERNAL MEDICINE

## 2024-01-29 PROCEDURE — 43239 EGD BIOPSY SINGLE/MULTIPLE: CPT | Performed by: INTERNAL MEDICINE

## 2024-01-29 PROCEDURE — 1160F RVW MEDS BY RX/DR IN RCRD: CPT | Performed by: INTERNAL MEDICINE

## 2024-01-29 RX ORDER — PANTOPRAZOLE SODIUM 40 MG/1
40 TABLET, DELAYED RELEASE ORAL 2 TIMES DAILY
Qty: 90 TABLET | Refills: 3 | Status: SHIPPED | OUTPATIENT
Start: 2024-01-29 | End: 2024-07-27

## 2024-01-30 ENCOUNTER — LAB REQUISITION (OUTPATIENT)
Dept: LAB | Facility: HOSPITAL | Age: 69
End: 2024-01-30
Payer: COMMERCIAL

## 2024-02-02 LAB
LABORATORY COMMENT REPORT: NORMAL
PATH REPORT.FINAL DX SPEC: NORMAL
PATH REPORT.GROSS SPEC: NORMAL
PATH REPORT.RELEVANT HX SPEC: NORMAL
PATH REPORT.TOTAL CANCER: NORMAL

## 2024-02-20 ASSESSMENT — CUP TO DISC RATIO
OS_RATIO: .3
OD_RATIO: .3

## 2024-02-20 ASSESSMENT — EXTERNAL EXAM - LEFT EYE: OS_EXAM: NORMAL

## 2024-02-20 ASSESSMENT — EXTERNAL EXAM - RIGHT EYE: OD_EXAM: NORMAL

## 2024-02-20 NOTE — PROGRESS NOTES
Dry eyes, [id number]  -Fluctuating blurry vision following COVID - likely due to dry eyes and refractive error.   -OCT macula (2/9/22) - SS: 10/10 OU. Normal thickness and contour OU. Intact IS-OS. No edema. 275 OU.   -OCT RNFL (2/9/22) - SS: 9/10 OU. WNL OU. 102/97.   -Not currently using warm compresses - may use daily OU PRN.   -Continue artificial tears 2-3 times a day  -F/u 1 year for comprehensive exam, sooner if symptoms worsen.     Combined form of age-related cataract, right eyeH25.811  Combined form of age-related cataract, left eyeH25.812  -Not visually significant at this time. Monitor.     Choroidal nevus, left eye  -Discussed exam finding. Discussed small risk of melanoma in lifetime with a choroidal nevus. No high risk characteristics seen at this time. Recommend routine dilated eye exams yearly to monitor appearance.     Hx of BUKXGS35.890  -LASIK with monovision early 1990`s, not sure which eye for distance or near - possibly OS for distance and OD for near.   -History of myopia (?) prior to LASIK    PVD (posterior vitreous detachment), right eyeH43.811  -Relatively asymptomatic.   -No retinal tear or detachment seen on exam. Retinal detachment symptoms discussed.     IsoelkjyfC30.00  Astigmatism  NutlnlptepI62.4  -Current glasses from 2023  -New Rx given per patient request - optional to fill.       No history of intraocular surgery  No FH of AMD/glaucoma

## 2024-02-21 ENCOUNTER — OFFICE VISIT (OUTPATIENT)
Dept: OPHTHALMOLOGY | Facility: CLINIC | Age: 69
End: 2024-02-21
Payer: COMMERCIAL

## 2024-02-21 DIAGNOSIS — H04.123 DRY EYES, BILATERAL: Primary | ICD-10-CM

## 2024-02-21 DIAGNOSIS — H25.813 COMBINED FORM OF AGE-RELATED CATARACT, BOTH EYES: ICD-10-CM

## 2024-02-21 DIAGNOSIS — Z98.890 HISTORY OF REFRACTIVE SURGERY: ICD-10-CM

## 2024-02-21 DIAGNOSIS — H52.203 ASTIGMATISM OF BOTH EYES, UNSPECIFIED TYPE: ICD-10-CM

## 2024-02-21 DIAGNOSIS — H52.03 HYPEROPIA, BILATERAL: ICD-10-CM

## 2024-02-21 DIAGNOSIS — H52.4 PRESBYOPIA: ICD-10-CM

## 2024-02-21 DIAGNOSIS — H43.811 PVD (POSTERIOR VITREOUS DETACHMENT), RIGHT EYE: ICD-10-CM

## 2024-02-21 DIAGNOSIS — D31.32 CHOROIDAL NEVUS OF LEFT EYE: ICD-10-CM

## 2024-02-21 PROCEDURE — 92015 DETERMINE REFRACTIVE STATE: CPT | Performed by: OPHTHALMOLOGY

## 2024-02-21 PROCEDURE — 92014 COMPRE OPH EXAM EST PT 1/>: CPT | Performed by: OPHTHALMOLOGY

## 2024-02-21 ASSESSMENT — REFRACTION_MANIFEST
OS_AXIS: 090
OD_AXIS: 070
OS_CYLINDER: -2.00
OS_SPHERE: +3.25
OD_SPHERE: +1.75
OD_ADD: +2.50
OS_ADD: +2.50
OD_CYLINDER: -0.75

## 2024-02-21 ASSESSMENT — REFRACTION_WEARINGRX
OD_AXIS: 070
OD_CYLINDER: -0.75
OS_SPHERE: +3.00
OS_CYLINDER: -2.00
OD_SPHERE: +1.75
OD_ADD: +2.50
OS_AXIS: 095
OS_ADD: +2.50

## 2024-02-21 ASSESSMENT — ENCOUNTER SYMPTOMS
CARDIOVASCULAR NEGATIVE: 0
MUSCULOSKELETAL NEGATIVE: 0
PSYCHIATRIC NEGATIVE: 0
ALLERGIC/IMMUNOLOGIC NEGATIVE: 0
CONSTITUTIONAL NEGATIVE: 0
RESPIRATORY NEGATIVE: 0
NEUROLOGICAL NEGATIVE: 0
EYES NEGATIVE: 1
HEMATOLOGIC/LYMPHATIC NEGATIVE: 0
ENDOCRINE NEGATIVE: 0
GASTROINTESTINAL NEGATIVE: 0

## 2024-02-21 ASSESSMENT — VISUAL ACUITY
METHOD: SNELLEN - LINEAR
OS_BAT_MED: 20/25
OS_CC: 20/25
CORRECTION_TYPE: GLASSES
OD_BAT_MED: 20/25
OD_CC: 20/20-

## 2024-02-21 ASSESSMENT — SLIT LAMP EXAM - LIDS
COMMENTS: GOOD POSITION, DERMATOCHALASIS
COMMENTS: GOOD POSITION, DERMATOCHALASIS

## 2024-02-21 ASSESSMENT — TONOMETRY
IOP_METHOD: GOLDMANN APPLANATION
OD_IOP_MMHG: 12
OS_IOP_MMHG: 12

## 2024-03-13 ENCOUNTER — CLINICAL SUPPORT (OUTPATIENT)
Dept: AUDIOLOGY | Facility: CLINIC | Age: 69
End: 2024-03-13
Payer: MEDICARE

## 2024-03-13 ENCOUNTER — OFFICE VISIT (OUTPATIENT)
Dept: OTOLARYNGOLOGY | Facility: CLINIC | Age: 69
End: 2024-03-13
Payer: COMMERCIAL

## 2024-03-13 VITALS — BODY MASS INDEX: 26.82 KG/M2 | WEIGHT: 198 LBS | HEIGHT: 72 IN

## 2024-03-13 DIAGNOSIS — H90.A21 SENSORINEURAL HEARING LOSS (SNHL) OF RIGHT EAR WITH RESTRICTED HEARING OF LEFT EAR: ICD-10-CM

## 2024-03-13 DIAGNOSIS — H90.3 SENSORINEURAL HEARING LOSS (SNHL) OF BOTH EARS: Primary | ICD-10-CM

## 2024-03-13 DIAGNOSIS — H60.8X3 CHRONIC ECZEMATOUS OTITIS EXTERNA OF BOTH EARS: ICD-10-CM

## 2024-03-13 DIAGNOSIS — H91.8X3 ASYMMETRICAL HEARING LOSS: ICD-10-CM

## 2024-03-13 DIAGNOSIS — H90.A32 MIXED CONDUCTIVE AND SENSORINEURAL HEARING LOSS OF LEFT EAR WITH RESTRICTED HEARING OF RIGHT EAR: Primary | ICD-10-CM

## 2024-03-13 PROCEDURE — 92557 COMPREHENSIVE HEARING TEST: CPT

## 2024-03-13 PROCEDURE — 99213 OFFICE O/P EST LOW 20 MIN: CPT | Performed by: NURSE PRACTITIONER

## 2024-03-13 PROCEDURE — 1160F RVW MEDS BY RX/DR IN RCRD: CPT | Performed by: NURSE PRACTITIONER

## 2024-03-13 PROCEDURE — 1126F AMNT PAIN NOTED NONE PRSNT: CPT | Performed by: NURSE PRACTITIONER

## 2024-03-13 PROCEDURE — 1036F TOBACCO NON-USER: CPT | Performed by: NURSE PRACTITIONER

## 2024-03-13 PROCEDURE — 92550 TYMPANOMETRY & REFLEX THRESH: CPT

## 2024-03-13 PROCEDURE — 1159F MED LIST DOCD IN RCRD: CPT | Performed by: NURSE PRACTITIONER

## 2024-03-13 RX ORDER — FLUOCINOLONE ACETONIDE 0.11 MG/ML
OIL AURICULAR (OTIC)
Qty: 20 ML | Refills: 2 | Status: SHIPPED | OUTPATIENT
Start: 2024-03-13

## 2024-03-13 ASSESSMENT — PATIENT HEALTH QUESTIONNAIRE - PHQ9
1. LITTLE INTEREST OR PLEASURE IN DOING THINGS: NOT AT ALL
2. FEELING DOWN, DEPRESSED OR HOPELESS: NOT AT ALL
SUM OF ALL RESPONSES TO PHQ9 QUESTIONS 1 AND 2: 0

## 2024-03-13 ASSESSMENT — PAIN SCALES - GENERAL: PAINLEVEL_OUTOF10: 0 - NO PAIN

## 2024-03-13 ASSESSMENT — PAIN - FUNCTIONAL ASSESSMENT: PAIN_FUNCTIONAL_ASSESSMENT: 0-10

## 2024-03-13 NOTE — PROGRESS NOTES
Subjective   Patient ID: Andrew Acharya is a 68 y.o. male who presents for Follow-up (Yearly follow up visit with hearing test.).  HPI  This patient presents for a yearly follow-up visit.  In review, he has a history of bilateral/asymmetrical sensorineural hearing loss and otitis externa.  Today, he reports ongoing right greater than left EAC itching.  He wears bilateral hearing aids with good benefit.  He denies any otalgia, otorrhea.  Review of Systems  A comprehensive or 10 points review of the patient´s constitutional, neurological, HEENT, pulmonary, cardiovascular and genito-urinary systems showed only those mentioned in history of present illness.    Objective   Physical Exam  Constitutional: no fever, chills, weight loss or weight gain   General appearance: Appears well, well-nourished, well groomed. No acute distress.   Communication: Normal communication   Psychiatric: Oriented to person, place and time. Normal mood and affect.   Neurologic: Cranial nerves II-XII grossly intact and symmetric bilaterally.   Head and Face:   Head: Atraumatic with no masses, lesions or scarring.   Face: Normal symmetry, no paralysis, synkinesis or facial tic. No scars or deformities.     Eyes: Conjunctiva not edematous or erythematous   Ears: External inspection of ears with no deformity, scars or masses.  Bilateral canals with scant dry cerumen.  EAC skin is slightly erythematous and peeling consistent with eczema.  Both TMs intact.  No effusions or retractions noted.     Neck: Normal appearing, symmetric, trachea midline.   Cardiovascular: Examination of peripheral vascular system shows no clubbing or cyanosis.   Respiratory: No respiratory distress increased work of breathing. Inspection of the chest with symmetric chest expansion and normal respiratory effort.   Skin: No rashes in the head or neck  My interpretation of the audiogram done today is right-sided with mild to moderate/severe sensorineural hearing loss with 88%  word recognition score and normal tympanogram.  Left side with profound sensorineural hearing loss with 72% word recognition score and normal tympanogram.  Based on my interpretation of his outside audiogram from May 2023, testing remained stable.  Unfortunately, this was not scanned into our system.  Assessment/Plan     This patient presents for subsequent evaluation of chronic acquired bilateral/asymmetrical sensorineural hearing loss and chronic eczematous otitis externa.    Reassurance given that audiogram has remained stable.  If he begins to notice decreased benefit in word understanding while wearing his hearing aids, he may become a candidate for cochlear implantation.  I recommended steroid drops at bedtime x 7 days then once per week for his eczema.  He will follow-up in 1 year with an audiogram.  All questions were answered to patient's satisfaction.    This note was created using speech recognition transcription software. Despite proofreading, several typographical errors might be present that might affect the meaning of the content. Please call with any questions.         EVANGELINA Browning-CNP 03/13/24 10:23 AM

## 2024-03-14 NOTE — PROGRESS NOTES
AUDIOLOGIC EVALUATION      Name:  Andrew Acharya  :  1955  Age:  68 y.o.  Date of Evaluation:  3/13/2024    Time: 830-900    HISTORY  Andrew Acharya, 68 y.o., was seen for an audiologic assessment prior to an ENT follow-up. He has a known bilateral asymmetric hearing loss, left ear poorer. He wears bilateral hearing aids that he acquired at an outside facility. He reported that the hearing in his right ear had dropped according to his fitting audiologist at an outside facility. He has long-standing bilateral otorrhea. He has a history of recreational and occupational noise exposure. He denies otalgia, dizziness, aural pressure/fullness, history of otologic surgeries, and recent falls.       RESULTS:  Otoscopic Evaluation:  Right Ear: Dry skin in the canal  Left Ear: Dry skin in the canal    Immittance Measures:  Right Ear: Type A -- indicating normal volume, pressure, and static compliance  Left Ear: Type A -- indicating normal volume, pressure, and static compliance    Acoustic Reflexes:  Right Ear: Responses absent 500-4000 Hz.   Left Ear: Responses absent 500-4000 Hz.     Pure Tone Audiometry:    Right Ear: Mild sloping to severe sensorineural hearing loss  Left Ear: Severe to profound mixed hearing loss    Asymmetry: Yes, left ear poorer 250-8000 Hz    There was no previous audiogram from his fitting audiologist scanned into his chart and he did not bring one with him today for comparison.     Reliability:   Good    Speech Audiometry:   Right: Speech Reception Threshold (SRT) was obtained at 50 dBHL.   SRT/PTA in Good agreement with pure tone average.    Left: Speech Reception Threshold (SRT) was obtained at 95 dBHL w/ masking.   SRT/PTA in Good agreement with pure tone average.    Word Recognition Scores (WRS):  Right Ear: good (88%) in quiet when words were presented at 90 dBHL.   Left Ear: fair (72%) in quiet when words were presented at 105 dBHL w/ masking.     Asymmetry: No      IMPRESSIONS:  Today's  testing revealed type A tympanograms (indicating normal volume, pressure, and static compliance),  bilaterally. He has mild sloping to severe sensorineural hearing loss in the right ear and a severe to profound mixed hearing loss in the left ear. He has a noted asymmetry, left ear poorer 250-8000 Hz. There was no previous audiogram from his fitting audiologist scanned into his chart and he did not bring one with him today for comparison. The results were discussed with the patient. He should follow-up with ENT as scheduled. He can continue full-time use of amplification and follow-up with his fitting audiologist as needed for adjustments. He should continue to monitor his hearing annually.       RECOMMENDATIONS:  1.) Continue medical follow up with ENT as recommended.  2.) Return for audiologic evaluation annually, or sooner should concerns arise.  3.) Continue full-time use of amplification.       Cheri Young, CCC-A  Clinical Audiologist        KEY  SNHL Sensorineural Hearing Loss   CHL Conductive Hearing Loss   MHL Mixed Hearing Loss   SSNHL Sudden Sensorineural Hearing Loss   WNL Within Normal Limits   PTA Pure Tone Average   TM Tympanic Membrane   ECV Ear Canal Volume   SRT Speech Reception Threshold   WRS Word Recognition Score

## 2025-02-23 ASSESSMENT — CUP TO DISC RATIO
OD_RATIO: .3
OS_RATIO: .3

## 2025-02-23 ASSESSMENT — EXTERNAL EXAM - RIGHT EYE: OD_EXAM: NORMAL

## 2025-02-23 ASSESSMENT — EXTERNAL EXAM - LEFT EYE: OS_EXAM: NORMAL

## 2025-02-23 ASSESSMENT — SLIT LAMP EXAM - LIDS
COMMENTS: GOOD POSITION, DERMATOCHALASIS
COMMENTS: GOOD POSITION, DERMATOCHALASIS

## 2025-02-23 NOTE — PROGRESS NOTES
Dry eyes, [id number]  -Fluctuating blurry vision following COVID - likely due to dry eyes and refractive error.   -OCT macula (2/9/22) - SS: 10/10 OU. Normal thickness and contour OU. Intact IS-OS. No edema. 275 OU.   -OCT RNFL (2/9/22) - SS: 9/10 OU. WNL OU. 102/97.   -Continue warm compresses and artificial tears PRN - uses occasionally.   -F/u 1 year for comprehensive exam, sooner if symptoms worsen.     Photopsias  -Occasionally notices wiggly flashes in periphery OS>OD  -No retinal tear or detachment seen on exam. Retinal detachment symptoms discussed.     Combined form of age-related cataract, right eyeH25.811  Combined form of age-related cataract, left eyeH25.812  -Not visually significant at this time. Monitor.     Choroidal nevus, left eye  -Discussed exam finding. Discussed small risk of melanoma in lifetime with a choroidal nevus. No high risk characteristics seen at this time. Recommend routine dilated eye exams yearly to monitor appearance.     Hx of WXALJU66.890  -LASIK with monovision early 1990`s, not sure which eye for distance or near - possibly OS for distance and OD for near.   -History of myopia (?) prior to LASIK    PVD (posterior vitreous detachment), right eyeH43.811  -Relatively asymptomatic.   -No retinal tear or detachment seen on exam. Retinal detachment symptoms discussed.     MoodljnnhR91.00  Astigmatism  KqjpmptsckK77.4  -Current glasses from 2024 - progressives  -Refraction performed today for diagnostic purposes only and without specific intent to dispense Rx. Glasses Rx not dispensed today.   -Add may be too low in current glasses, has to lift to read. Next time getting glasses, may consider smaller frame vs raising seg height vs increasing add.       No history of intraocular surgery  No FH of AMD/glaucoma

## 2025-02-26 ENCOUNTER — APPOINTMENT (OUTPATIENT)
Dept: OPHTHALMOLOGY | Facility: CLINIC | Age: 70
End: 2025-02-26
Payer: COMMERCIAL

## 2025-02-26 DIAGNOSIS — H52.4 PRESBYOPIA: ICD-10-CM

## 2025-02-26 DIAGNOSIS — D31.32 CHOROIDAL NEVUS OF LEFT EYE: ICD-10-CM

## 2025-02-26 DIAGNOSIS — H52.203 ASTIGMATISM OF BOTH EYES, UNSPECIFIED TYPE: ICD-10-CM

## 2025-02-26 DIAGNOSIS — H25.811 COMBINED FORM OF AGE-RELATED CATARACT, RIGHT EYE: ICD-10-CM

## 2025-02-26 DIAGNOSIS — H04.123 DRY EYES, BILATERAL: Primary | ICD-10-CM

## 2025-02-26 DIAGNOSIS — H53.19 PHOTOPSIA: ICD-10-CM

## 2025-02-26 DIAGNOSIS — H52.03 HYPEROPIA, BILATERAL: ICD-10-CM

## 2025-02-26 DIAGNOSIS — Z98.890 HISTORY OF REFRACTIVE SURGERY: ICD-10-CM

## 2025-02-26 DIAGNOSIS — H25.812 COMBINED FORM OF AGE-RELATED CATARACT, LEFT EYE: ICD-10-CM

## 2025-02-26 DIAGNOSIS — H43.811 PVD (POSTERIOR VITREOUS DETACHMENT), RIGHT EYE: ICD-10-CM

## 2025-02-26 PROCEDURE — 92014 COMPRE OPH EXAM EST PT 1/>: CPT | Performed by: OPHTHALMOLOGY

## 2025-02-26 ASSESSMENT — VISUAL ACUITY
METHOD: SNELLEN - LINEAR
CORRECTION_TYPE: GLASSES
OD_CC: 20/25
OS_CC: 20/25
OD_CC+: +1

## 2025-02-26 ASSESSMENT — REFRACTION_WEARINGRX
OS_CYLINDER: -2.00
OS_AXIS: 090
OD_ADD: +2.50
OD_CYLINDER: -0.75
OS_ADD: +2.50
OD_AXIS: 070
OS_SPHERE: +3.25
OD_SPHERE: +1.75

## 2025-02-26 ASSESSMENT — CONF VISUAL FIELD
OS_INFERIOR_NASAL_RESTRICTION: 0
OD_SUPERIOR_NASAL_RESTRICTION: 0
OS_INFERIOR_TEMPORAL_RESTRICTION: 0
OD_INFERIOR_TEMPORAL_RESTRICTION: 0
OS_SUPERIOR_NASAL_RESTRICTION: 0
OD_NORMAL: 1
OS_NORMAL: 1
OD_INFERIOR_NASAL_RESTRICTION: 0
OD_SUPERIOR_TEMPORAL_RESTRICTION: 0
OS_SUPERIOR_TEMPORAL_RESTRICTION: 0

## 2025-02-26 ASSESSMENT — ENCOUNTER SYMPTOMS
ENDOCRINE NEGATIVE: 0
CONSTITUTIONAL NEGATIVE: 0
EYES NEGATIVE: 1
MUSCULOSKELETAL NEGATIVE: 0
ALLERGIC/IMMUNOLOGIC NEGATIVE: 0
GASTROINTESTINAL NEGATIVE: 0
HEMATOLOGIC/LYMPHATIC NEGATIVE: 0
NEUROLOGICAL NEGATIVE: 0
PSYCHIATRIC NEGATIVE: 0
CARDIOVASCULAR NEGATIVE: 0
RESPIRATORY NEGATIVE: 0

## 2025-02-26 ASSESSMENT — REFRACTION_MANIFEST
OS_AXIS: 090
OS_ADD: +2.50
OD_SPHERE: +1.75
OS_CYLINDER: -2.00
OS_SPHERE: +3.50
OD_AXIS: 070
OD_CYLINDER: -1.00
OD_ADD: +2.50

## 2025-02-26 ASSESSMENT — TONOMETRY
IOP_METHOD: GOLDMANN APPLANATION
OD_IOP_MMHG: 12
OS_IOP_MMHG: 12

## 2025-03-14 ENCOUNTER — APPOINTMENT (OUTPATIENT)
Dept: OTOLARYNGOLOGY | Facility: CLINIC | Age: 70
End: 2025-03-14
Payer: COMMERCIAL

## 2025-04-17 ENCOUNTER — APPOINTMENT (OUTPATIENT)
Dept: OTOLARYNGOLOGY | Facility: CLINIC | Age: 70
End: 2025-04-17
Payer: COMMERCIAL

## 2025-04-17 VITALS — HEIGHT: 72 IN | BODY MASS INDEX: 26.82 KG/M2 | WEIGHT: 198 LBS

## 2025-04-17 DIAGNOSIS — H90.3 SENSORINEURAL HEARING LOSS (SNHL) OF BOTH EARS: ICD-10-CM

## 2025-04-17 DIAGNOSIS — H60.8X3 CHRONIC ECZEMATOUS OTITIS EXTERNA OF BOTH EARS: ICD-10-CM

## 2025-04-17 DIAGNOSIS — H91.8X3 ASYMMETRICAL HEARING LOSS: ICD-10-CM

## 2025-04-17 DIAGNOSIS — H61.23 BILATERAL IMPACTED CERUMEN: Primary | ICD-10-CM

## 2025-04-17 PROCEDURE — 1036F TOBACCO NON-USER: CPT | Performed by: NURSE PRACTITIONER

## 2025-04-17 PROCEDURE — 99214 OFFICE O/P EST MOD 30 MIN: CPT | Performed by: NURSE PRACTITIONER

## 2025-04-17 PROCEDURE — 1159F MED LIST DOCD IN RCRD: CPT | Performed by: NURSE PRACTITIONER

## 2025-04-17 PROCEDURE — 1160F RVW MEDS BY RX/DR IN RCRD: CPT | Performed by: NURSE PRACTITIONER

## 2025-04-17 PROCEDURE — 3008F BODY MASS INDEX DOCD: CPT | Performed by: NURSE PRACTITIONER

## 2025-04-17 RX ORDER — FLUOCINOLONE ACETONIDE 0.11 MG/ML
OIL AURICULAR (OTIC)
Qty: 20 ML | Refills: 2 | Status: SHIPPED | OUTPATIENT
Start: 2025-04-17

## 2025-04-17 ASSESSMENT — PATIENT HEALTH QUESTIONNAIRE - PHQ9
1. LITTLE INTEREST OR PLEASURE IN DOING THINGS: NOT AT ALL
SUM OF ALL RESPONSES TO PHQ9 QUESTIONS 1 AND 2: 0
2. FEELING DOWN, DEPRESSED OR HOPELESS: NOT AT ALL

## 2025-04-17 NOTE — PROGRESS NOTES
Subjective   Patient ID: Andrew Acharya is a 69 y.o. male who presents for Cerumen Impaction.  HPI  This patient presents for a yearly follow-up visit.  In review, he has a history of bilateral/asymmetrical sensorineural hearing loss and chronic eczematous otitis externa.  Today, he complains of intermittent clear otorrhea as well as bilateral EAC itching.  He wears a hearing aid in the right ear only.  The drainage frequently disrupts the function of his hearing aid.  Review of Systems  A comprehensive or 10 points review of the patient's constitutional, neurological, HEENT, pulmonary, cardiovascular and genito-urinary systems showed only those mentioned in history of present illness.    Objective   Physical Exam  Constitutional: no fever, chills, weight loss or weight gain   General appearance: Appears well, well-nourished, well groomed. No acute distress.   Communication: Normal communication   Psychiatric: Oriented to person, place and time. Normal mood and affect.   Neurologic: Cranial nerves II-XII grossly intact and symmetric bilaterally.   Head and Face:   Head: Atraumatic with no masses, lesions or scarring.   Face: Normal symmetry, no paralysis, synkinesis or facial tic. No scars or deformities.     Eyes: Conjunctiva not edematous or erythematous   Ears: External inspection of ears with no deformity, scars or masses.  Bilateral canals narrow with cerumen impactions     Neck: Normal appearing, symmetric, trachea midline.   Cardiovascular: Examination of peripheral vascular system shows no clubbing or cyanosis.   Respiratory: No respiratory distress increased work of breathing. Inspection of the chest with symmetric chest expansion and normal respiratory effort.   Skin: No rashes in the head or neck    Assessment/Plan        This patient presents for subsequent evaluation of acute acquired bilateral cerumen impaction as well as chronic bilateral eczematous otitis externa and bilateral/asymmetrical  sensorineural hearing loss.    We discussed that after cerumen removal, there are still skin changes consistent with eczema to both ear canals.  Fortunately, this does not appear to be infected.  Both TMs look great.  I recommended using steroid drops twice a day x 7 days then use once per week.  If this does not control the otorrhea, I asked that he contact my office for a follow-up visit.  Otherwise, he may follow-up as needed.  All questions were answered to patient's satisfaction.    This note was created using speech recognition transcription software. Despite proofreading, several typographical errors might be present that might affect the meaning of the content. Please call with any questions.    Patient ID: Andrew Acharya is a 69 y.o. male.    Ear cerumen removal    Date/Time: 4/17/2025 10:49 AM    Performed by: ANY Browning  Authorized by: ANY Browning    Consent:     Consent obtained:  Verbal    Consent given by:  Patient    Risks discussed:  Pain    Alternatives discussed:  No treatment  Procedure details:     Location:  L ear and R ear    Procedure type comment:  Right angle hook and suction    Procedure outcomes: cerumen removed    Post-procedure details:     Inspection:  No bleeding, ear canal clear and TM intact    Hearing quality:  Improved    Procedure completion:  Tolerated well, no immediate complications  Comments:      Bilateral EACs slightly erythematous and edematous with peeling skin consistent with eczema.  No purulence noted.    ANY Browning 04/17/25 10:46 AM

## 2026-03-04 ENCOUNTER — APPOINTMENT (OUTPATIENT)
Dept: OPHTHALMOLOGY | Age: 71
End: 2026-03-04
Payer: COMMERCIAL